# Patient Record
Sex: MALE | Race: BLACK OR AFRICAN AMERICAN
[De-identification: names, ages, dates, MRNs, and addresses within clinical notes are randomized per-mention and may not be internally consistent; named-entity substitution may affect disease eponyms.]

---

## 2019-01-09 ENCOUNTER — HOSPITAL ENCOUNTER (EMERGENCY)
Dept: HOSPITAL 47 - EC | Age: 44
LOS: 1 days | Discharge: HOME | End: 2019-01-10
Payer: COMMERCIAL

## 2019-01-09 DIAGNOSIS — K52.9: Primary | ICD-10-CM

## 2019-01-09 PROCEDURE — 36415 COLL VENOUS BLD VENIPUNCTURE: CPT

## 2019-01-09 PROCEDURE — 96376 TX/PRO/DX INJ SAME DRUG ADON: CPT

## 2019-01-09 PROCEDURE — 99284 EMERGENCY DEPT VISIT MOD MDM: CPT

## 2019-01-09 PROCEDURE — 85025 COMPLETE CBC W/AUTO DIFF WBC: CPT

## 2019-01-09 PROCEDURE — 82150 ASSAY OF AMYLASE: CPT

## 2019-01-09 PROCEDURE — 96361 HYDRATE IV INFUSION ADD-ON: CPT

## 2019-01-09 PROCEDURE — 74176 CT ABD & PELVIS W/O CONTRAST: CPT

## 2019-01-09 PROCEDURE — 83690 ASSAY OF LIPASE: CPT

## 2019-01-09 PROCEDURE — 80053 COMPREHEN METABOLIC PANEL: CPT

## 2019-01-09 PROCEDURE — 96374 THER/PROPH/DIAG INJ IV PUSH: CPT

## 2019-01-10 VITALS
DIASTOLIC BLOOD PRESSURE: 67 MMHG | RESPIRATION RATE: 19 BRPM | SYSTOLIC BLOOD PRESSURE: 123 MMHG | TEMPERATURE: 99 F | HEART RATE: 77 BPM

## 2019-01-10 LAB
ALBUMIN SERPL-MCNC: 4.9 G/DL (ref 3.5–5)
ALP SERPL-CCNC: 40 U/L (ref 38–126)
ALT SERPL-CCNC: 27 U/L (ref 21–72)
AMYLASE SERPL-CCNC: 111 U/L (ref 30–110)
ANION GAP SERPL CALC-SCNC: 11 MMOL/L
AST SERPL-CCNC: 35 U/L (ref 17–59)
BASOPHILS # BLD AUTO: 0 K/UL (ref 0–0.2)
BASOPHILS NFR BLD AUTO: 0 %
BUN SERPL-SCNC: 12 MG/DL (ref 9–20)
CALCIUM SPEC-MCNC: 10.2 MG/DL (ref 8.4–10.2)
CHLORIDE SERPL-SCNC: 104 MMOL/L (ref 98–107)
CO2 SERPL-SCNC: 25 MMOL/L (ref 22–30)
EOSINOPHIL # BLD AUTO: 0 K/UL (ref 0–0.7)
EOSINOPHIL NFR BLD AUTO: 0 %
ERYTHROCYTE [DISTWIDTH] IN BLOOD BY AUTOMATED COUNT: 5.18 M/UL (ref 4.3–5.9)
ERYTHROCYTE [DISTWIDTH] IN BLOOD: 12.9 % (ref 11.5–15.5)
GLUCOSE SERPL-MCNC: 108 MG/DL (ref 74–99)
HCT VFR BLD AUTO: 43.1 % (ref 39–53)
HGB BLD-MCNC: 14.2 GM/DL (ref 13–17.5)
LIPASE SERPL-CCNC: 28 U/L (ref 23–300)
LYMPHOCYTES # SPEC AUTO: 1 K/UL (ref 1–4.8)
LYMPHOCYTES NFR SPEC AUTO: 9 %
MCH RBC QN AUTO: 27.5 PG (ref 25–35)
MCHC RBC AUTO-ENTMCNC: 33 G/DL (ref 31–37)
MCV RBC AUTO: 83.3 FL (ref 80–100)
MONOCYTES # BLD AUTO: 0.6 K/UL (ref 0–1)
MONOCYTES NFR BLD AUTO: 5 %
NEUTROPHILS # BLD AUTO: 9.8 K/UL (ref 1.3–7.7)
NEUTROPHILS NFR BLD AUTO: 85 %
PLATELET # BLD AUTO: 186 K/UL (ref 150–450)
POTASSIUM SERPL-SCNC: 4.2 MMOL/L (ref 3.5–5.1)
PROT SERPL-MCNC: 8.1 G/DL (ref 6.3–8.2)
SODIUM SERPL-SCNC: 140 MMOL/L (ref 137–145)
WBC # BLD AUTO: 11.5 K/UL (ref 3.8–10.6)

## 2019-01-10 NOTE — ED
Nausea/Vomiting/Diarrhea HPI





- General


Chief complaint: Nausea/Vomiting/Diarrhea


Stated complaint: Vomiting


Time Seen by Provider: 19 23:55


Source: patient


Mode of arrival: ambulatory


Limitations: no limitations





- History of Present Illness


Initial comments: 





This patient is a 43-year-old man who had the onset of vomiting and diarrhea 

this morning.  He states that he did have some upper abdominal cramping briefly 

around the time of some episodes of emesis but otherwise denies abdominal pain.

  Patient became concerned when he saw some streaks of blood in the emesis.  He 

denies symptoms of anemia, including no chest pain, dyspnea, diaphoresis, 

palpitations, lightheadedness or syncope.


MD complaint: nausea, vomiting, diarrhea


Onset/Timin


-: hour(s)


Description of Vomiting: food contents, watery, blood-streaked


Description of Diarrhea: water


Associated Abdominal Pain: No


Improves with: none


Worsens with: none


Associated Symptoms: denies other symptoms





- Related Data


 Previous Rx's











 Medication  Instructions  Recorded


 


Ondansetron Odt [Zofran ODT] 4 mg PO Q8HR PRN #10 tab 01/10/19











 Allergies











Allergy/AdvReac Type Severity Reaction Status Date / Time


 


No Known Allergies Allergy   Verified 19 23:15














Review of Systems


ROS Statement: 


Those systems with pertinent positive or pertinent negative responses have been 

documented in the HPI.





ROS Other: All systems not noted in ROS Statement are negative.


Constitutional: Denies: fever, chills, weakness


Respiratory: Denies: cough, dyspnea


Cardiovascular: Denies: chest pain, palpitations, edema, syncope


Gastrointestinal: Reports: nausea, vomiting, diarrhea, hematemesis.  Denies: 

melena, hematochezia


Genitourinary: Denies: dysuria, hematuria


Musculoskeletal: Denies: back pain


Skin: Denies: rash


Neurological: Denies: headache





Past Medical History


Past Medical History: No Reported History


History of Any Multi-Drug Resistant Organisms: None Reported


Past Surgical History: No Surgical Hx Reported


Past Psychological History: No Psychological Hx Reported


Smoking Status: Never smoker


Past Alcohol Use History: Occasional


Past Drug Use History: Marijuana





General Exam


Limitations: no limitations


General appearance: alert, in no apparent distress


Head exam: Present: atraumatic, normocephalic


Eye exam: Present: normal appearance.  Absent: scleral icterus, conjunctival 

injection


ENT exam: Present: mucous membranes dry


Neck exam: Present: normal inspection, full ROM


Respiratory exam: Present: normal lung sounds bilaterally.  Absent: respiratory 

distress, wheezes, rales, rhonchi, stridor


Cardiovascular Exam: Present: regular rate, normal rhythm, normal heart sounds.

  Absent: systolic murmur, diastolic murmur, rubs, gallop


GI/Abdominal exam: Present: soft, tenderness (There is mild right lower 

quadrant tenderness without rebound or guarding), hypoactive bowel sounds.  

Absent: distended, guarding, rebound, rigid, mass, bruit, pulsatile mass


Extremities exam: Present: normal inspection, normal capillary refill.  Absent: 

pedal edema, calf tenderness


Back exam: Present: normal inspection.  Absent: CVA tenderness (R), CVA 

tenderness (L)


Neurological exam: Present: alert


Skin exam: Present: warm, dry, intact, normal color.  Absent: rash





Course


 Vital Signs











  01/09/19 01/10/19





  22:17 01:43


 


Temperature 97.7 F 99.0 F


 


Pulse Rate 62 77


 


Respiratory 16 19





Rate  


 


Blood Pressure 111/78 123/67


 


O2 Sat by Pulse 98 99





Oximetry  














Medical Decision Making





- Medical Decision Making





Patient's 43-year-old man with persistent nausea, vomiting, diarrhea since 

about 8 this morning.  His exam does have some mild right lower quadrant 

tenderness and given the persistent vomiting and diarrhea will rule out 

appendicitis.





The computed tomography scan is negative.  The patient is feeling better 

following fluids and medication and at this point would like to go home.  We 

discussed appropriate return parameters as well as follow-up.





- Lab Data


Result diagrams: 


 19 23:56





 19 23:56


 Lab Results











  19 Range/Units





  23:56 23:56 


 


WBC   11.5 H  (3.8-10.6)  k/uL


 


RBC   5.18  (4.30-5.90)  m/uL


 


Hgb   14.2  (13.0-17.5)  gm/dL


 


Hct   43.1  (39.0-53.0)  %


 


MCV   83.3  (80.0-100.0)  fL


 


MCH   27.5  (25.0-35.0)  pg


 


MCHC   33.0  (31.0-37.0)  g/dL


 


RDW   12.9  (11.5-15.5)  %


 


Plt Count   186  (150-450)  k/uL


 


Neutrophils %   85  %


 


Lymphocytes %   9  %


 


Monocytes %   5  %


 


Eosinophils %   0  %


 


Basophils %   0  %


 


Neutrophils #   9.8 H  (1.3-7.7)  k/uL


 


Lymphocytes #   1.0  (1.0-4.8)  k/uL


 


Monocytes #   0.6  (0-1.0)  k/uL


 


Eosinophils #   0.0  (0-0.7)  k/uL


 


Basophils #   0.0  (0-0.2)  k/uL


 


Sodium  140   (137-145)  mmol/L


 


Potassium  4.2   (3.5-5.1)  mmol/L


 


Chloride  104   ()  mmol/L


 


Carbon Dioxide  25   (22-30)  mmol/L


 


Anion Gap  11   mmol/L


 


BUN  12   (9-20)  mg/dL


 


Creatinine  1.01   (0.66-1.25)  mg/dL


 


Est GFR (CKD-EPI)AfAm  >90   (>60 ml/min/1.73 sqM)  


 


Est GFR (CKD-EPI)NonAf  >90   (>60 ml/min/1.73 sqM)  


 


Glucose  108 H   (74-99)  mg/dL


 


Calcium  10.2   (8.4-10.2)  mg/dL


 


Total Bilirubin  1.2   (0.2-1.3)  mg/dL


 


AST  35   (17-59)  U/L


 


ALT  27   (21-72)  U/L


 


Alkaline Phosphatase  40   ()  U/L


 


Total Protein  8.1   (6.3-8.2)  g/dL


 


Albumin  4.9   (3.5-5.0)  g/dL


 


Amylase  111 H   ()  U/L


 


Lipase  28   ()  U/L














Disposition


Clinical Impression: 


 Gastroenteritis





Disposition: HOME SELF-CARE


Condition: Good


Instructions:  Acute Nausea and Vomiting (ED)


Prescriptions: 


Ondansetron Odt [Zofran ODT] 4 mg PO Q8HR PRN #10 tab


 PRN Reason: Nausea


Is patient prescribed a controlled substance at d/c from ED?: No


Referrals: 


Trenton Pierson MD [Primary Care Provider] - 1-2 days

## 2019-01-10 NOTE — CT
EXAMINATION TYPE: CT abdomen pelvis wo con

 

DATE OF EXAM: 1/10/2019

 

COMPARISON: None

 

HISTORY: Patient presents with RLQ abdominal pain. Patient states nausea, vomiting, and diarrhea.

 

CT DLP: 318.2 mGycm

Automated exposure control for dose reduction was used.

 

TECHNIQUE:  Helical acquisition of images was performed from the lung bases through the pelvis.

 

FINDINGS: 

 

Lung bases are clear. There is no pleural effusion. Heart size is normal. There is no pericardial eff
usion.

 

Liver spleen pancreas appear normal. Gallbladder appears normal. Bile ducts are not dilated.

 

There is no adrenal mass. Kidneys have normal size. There is no hydronephrosis. There is no retroperi
toneal adenopathy. Bladder distends smoothly. There is no inguinal hernia. There is no free fluid in 
the pelvis. Appendix appears to be visualized posteriorly and appears normal. There is no sign of a t
hickened appendix. There is no mesenteric edema. There is no sign of a bowel obstruction. There are n
o dilated loops. The bony structures appear intact. There is no evidence of pneumoperitoneum.

IMPRESSION: 

NEGATIVE CT SCAN OF THE ABDOMEN AND PELVIS. NO SIGN OF APPENDICITIS.